# Patient Record
Sex: FEMALE | URBAN - METROPOLITAN AREA
[De-identification: names, ages, dates, MRNs, and addresses within clinical notes are randomized per-mention and may not be internally consistent; named-entity substitution may affect disease eponyms.]

---

## 2019-01-03 ENCOUNTER — OFFICE VISIT (OUTPATIENT)
Dept: URBAN - METROPOLITAN AREA CLINIC 88 | Facility: CLINIC | Age: 54
End: 2019-01-03
Payer: COMMERCIAL

## 2019-01-03 DIAGNOSIS — H43.813 VITREOUS DEGENERATION, BILATERAL: ICD-10-CM

## 2019-01-03 DIAGNOSIS — H04.123 DRY EYE SYNDROME OF BILATERAL LACRIMAL GLANDS: ICD-10-CM

## 2019-01-03 DIAGNOSIS — H25.13 AGE-RELATED NUCLEAR CATARACT, BILATERAL: ICD-10-CM

## 2019-01-03 DIAGNOSIS — E11.9 TYPE 2 DIABETES MELLITUS W/O COMPLICATION: Primary | ICD-10-CM

## 2019-01-03 PROCEDURE — 99204 OFFICE O/P NEW MOD 45 MIN: CPT | Performed by: OPTOMETRIST

## 2019-01-03 ASSESSMENT — INTRAOCULAR PRESSURE
OD: 17
OS: 17

## 2019-01-03 ASSESSMENT — VISUAL ACUITY
OD: 20/25
OS: 20/25

## 2019-01-03 NOTE — IMPRESSION/PLAN
Impression: Type 2 diabetes mellitus w/o complication: E33.3. Plan: A detailed review of ocular findings was discussed. The patient understands that at this time there are no changes, related to diabetes, taking place with their eyes. Patient understands the importance of monitoring blood glucose and blood pressure levels with their primary care physician. Diet and exercise recommended.